# Patient Record
(demographics unavailable — no encounter records)

---

## 2025-02-20 NOTE — END OF VISIT
2.06
[FreeTextEntry3] : I was present with the PA during the key portions of the history and exam. I agree with the findings and plan as documented in the PA's note, unless noted below.

## 2025-02-20 NOTE — HISTORY OF PRESENT ILLNESS
[de-identified] : 60 yo female was referred by Dr. Thomas for consultation of adjuvant endocrine therapy for DCIS. She had screening mammo on 21 which showed an apparent mass seen in the central of the right breast. 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 The pathology revealed ductal carcinoma in-situ (DCIS), cribriform and solid types with comedo necrosis and calcifications, intermediate nuclear grade. The intraductal tumor is present in three of ten H T E slides examined from this entirely submitted specimen (3/10). All margin excisions are negative. \par  \par  She recovered well from surgery. She saw Dr. Garner and discussed adjuvant breast radiotherapy. She opted to forgo radiation.\par  \par  She is  and was menopause in . She has no family history of breast or ovarian cancer. [de-identified] : 12/22/21 Dayanara is here for follow up. She has right breast DCIS, intermediate nuclear grade, ER/CT positive, s/p right breast lumpectomy with negative margins. She was not indicated for adjuvant breast radiation. She was started on anastrazole in 9/2021, tolerating fairly well. She started experiencing vaginal bleeding in October. She had an endometrial biopsy on 10/11 which showed microscopic foci of well-differentiated endometrioid carcinoma, arising in a background of complex atypical hyperplasia. She saw Dr Negron and on 11/29 she had hysterectomy and bilateral salpingo-oophorectomy. Uterine corpus showed endometrioid carcinoma, FIGO grade I, pT1a. The tumor is superficial (confined to endometrium); does not invade myometrium. Cervix, Ovaries and fallopian tubes are uninvolved. MMR proteins are all expressed. She recovered well from surgery.   6/29/22: Dayanara is here for follow up for right breast DCIS, intermediate nuclear grade, ER/CT positive, s/p right breast lumpectomy with negative margins. She was not indicated for adjuvant breast radiation. She has been on anastrazole since 9/2021 and tolerating well. She hadvaginal bleeding in October. She had an endometrial biopsy on 10/11 which showed microscopic foci of well-differentiated endometrioid carcinoma, arising in a background of complex atypical hyperplasia. She saw Dr Negron and on 11/29 she had hysterectomy and bilateral salpingo-oophorectomy. Uterine corpus showed endometrioid carcinoma, FIGO grade I, pT1a. The tumor is superficial (confined to endometrium); does not invade myometrium. Cervix, Ovaries and fallopian tubes are uninvolved. MMR proteins are all expressed. She is feeling well and cummings snot have new complains.  1/4/23: Dayanara is here for follow up for right breast DCIS, intermediate nuclear grade, ER/CT positive, s/p right breast lumpectomy with negative margins. She was not indicated for adjuvant breast radiation. She has been on anastrazole since 9/2021 and tolerating well. She had b/l dx mammo in 6/2022. There was no suspicious finding. She had vaginal bleeding in October. She had an endometrial biopsy on 10/11 which showed microscopic foci of well-differentiated endometrioid carcinoma, arising in a background of complex atypical hyperplasia. She saw Dr Negron and on 11/29 she had hysterectomy and bilateral salpingo-oophorectomy. Uterine corpus showed endometrioid carcinoma, FIGO grade I, pT1a. The tumor is superficial (confined to endometrium); does not invade myometrium. Cervix, Ovaries and fallopian tubes are uninvolved. MMR proteins are all expressed. She is feeling well and does not have new complains.  7/12/23: Dayanara is here for follow up for right breast DCIS, intermediate nuclear grade, ER/CT positive, s/p right breast lumpectomy with negative margins. She was not indicated for adjuvant breast radiation. She has been on anastrazole since 9/2021 and tolerating well. She had b/l dx mammo in 6/2023. There was no suspicious finding. She is s/p hysterectomy and bilateral salpingo-oophorectomy in 11/2022. Uterine corpus showed endometrioid carcinoma, FIGO grade I, pT1a. The tumor is superficial (confined to endometrium); does not invade myometrium. Cervix, Ovaries and fallopian tubes are uninvolved. MMR proteins are all expressed. She is feeling well and does not have new complains.  1/10/24 Dayanara is here for follow up for right breast DCIS, intermediate nuclear grade, ER/CT positive, s/p right breast lumpectomy with negative margins. She was not indicated for adjuvant breast radiation. She has been on anastrazole since 9/2021 and tolerating well. She had b/l dx mammo in 6/2023. There was no suspicious finding. She is s/p hysterectomy and bilateral salpingo-oophorectomy in 11/2022. Uterine corpus showed endometrioid carcinoma, FIGO grade I, pT1a. The tumor is superficial (confined to endometrium); does not invade myometrium. Cervix, Ovaries and fallopian tubes are uninvolved. MMR proteins are all expressed. She is feeling well and does not have new complaints. She denies bleeding, or change in urine or bowel habits.  7/3/24 Dayanara is here for follow up for right breast DCIS, intermediate nuclear grade, ER/CT positive, s/p right breast lumpectomy with negative margins. She was not indicated for adjuvant breast radiation. She has been on anastrazole since 9/2021 and tolerating well. She had b/l dx mammo in 6/19/2024. There was no suspicious finding. She is s/p hysterectomy and bilateral salpingo-oophorectomy in 11/2022. Uterine corpus showed endometrioid carcinoma, FIGO grade I, pT1a. The tumor is superficial (confined to endometrium); does not invade myometrium. Cervix, Ovaries and fallopian tubes are uninvolved. MMR proteins are all expressed. She is feeling well, although she admits to feeling bony aches and pains since being on the anastrozole. She denies bleeding, or change in urine or bowel habits. She admits to some fatigue and SOB with exertion at times. She was recommended to have full cardiac evaluation, which is scheduled.  2/20/25 Dayanara is here for follow up for right breast DCIS, intermediate nuclear grade, ER/CT positive, s/p right breast lumpectomy with negative margins. She was not indicated for adjuvant breast radiation. She has been on Anastrazole since 9/2021 and tolerating well except for intermittent myalgia and arthralgia. She had b/l screening mammo in 6/19/2024. There was no suspicious finding. She is s/p hysterectomy and bilateral salpingo-oophorectomy in 11/2022. Uterine corpus showed endometrioid carcinoma, FIGO grade I, pT1a. The tumor is superficial (confined to endometrium); does not invade myometrium. Cervix, Ovaries and fallopian tubes are uninvolved. MMR proteins are all expressed. She has not seen gyn/onc since after her last visit with Dr Figueroa on 10/7/2022. She denies abdominal pain, nausea, vomiting or abnormal vaginal bleeding/discharge. She had CBC done on 9/2024 with platelets 96 K/uL, denies bleeding or bruising.

## 2025-02-20 NOTE — HISTORY OF PRESENT ILLNESS
[de-identified] : 62 yo female was referred by Dr. Thomas for consultation of adjuvant endocrine therapy for DCIS. She had screening mammo on 21 which showed an apparent mass seen in the central of the right breast. On 4/15/21, right breast dx mammo and US showed 0.8 x 0.6 x 0.4 cm Indeterminate right breast mass at the 8:00 position for which ultrasound-guided biopsy is recommended.\par  \par  On 21, ultrasound guided needle core biopsies of right breast mass revealed Ductal carcinoma in-situ (DCIS), ER/CA pos 100%, solid and micropapillary types with comedo necrosis and calcifications, intermediate nuclear grade; extending to involve a sclerosing intraductal papilloma.\par  \par  On 21, she underwent right lower outer quadrant mass, radio frequency seed localized lumpectomy. The pathology revealed ductal carcinoma in-situ (DCIS), cribriform and solid types with comedo necrosis and calcifications, intermediate nuclear grade. The intraductal tumor is present in three of ten H T E slides examined from this entirely submitted specimen (3/10). All margin excisions are negative. \par  \par  She recovered well from surgery. She saw Dr. Garner and discussed adjuvant breast radiotherapy. She opted to forgo radiation.\par  \par  She is  and was menopause in . She has no family history of breast or ovarian cancer. [de-identified] : 12/22/21 Dayanara is here for follow up. She has right breast DCIS, intermediate nuclear grade, ER/HI positive, s/p right breast lumpectomy with negative margins. She was not indicated for adjuvant breast radiation. She was started on anastrazole in 9/2021, tolerating fairly well. She started experiencing vaginal bleeding in October. She had an endometrial biopsy on 10/11 which showed microscopic foci of well-differentiated endometrioid carcinoma, arising in a background of complex atypical hyperplasia. She saw Dr Negron and on 11/29 she had hysterectomy and bilateral salpingo-oophorectomy. Uterine corpus showed endometrioid carcinoma, FIGO grade I, pT1a. The tumor is superficial (confined to endometrium); does not invade myometrium. Cervix, Ovaries and fallopian tubes are uninvolved. MMR proteins are all expressed. She recovered well from surgery.   6/29/22: Dayanara is here for follow up for right breast DCIS, intermediate nuclear grade, ER/HI positive, s/p right breast lumpectomy with negative margins. She was not indicated for adjuvant breast radiation. She has been on anastrazole since 9/2021 and tolerating well. She hadvaginal bleeding in October. She had an endometrial biopsy on 10/11 which showed microscopic foci of well-differentiated endometrioid carcinoma, arising in a background of complex atypical hyperplasia. She saw Dr Negron and on 11/29 she had hysterectomy and bilateral salpingo-oophorectomy. Uterine corpus showed endometrioid carcinoma, FIGO grade I, pT1a. The tumor is superficial (confined to endometrium); does not invade myometrium. Cervix, Ovaries and fallopian tubes are uninvolved. MMR proteins are all expressed. She is feeling well and cummings snot have new complains.  1/4/23: Dayanara is here for follow up for right breast DCIS, intermediate nuclear grade, ER/HI positive, s/p right breast lumpectomy with negative margins. She was not indicated for adjuvant breast radiation. She has been on anastrazole since 9/2021 and tolerating well. She had b/l dx mammo in 6/2022. There was no suspicious finding. She had vaginal bleeding in October. She had an endometrial biopsy on 10/11 which showed microscopic foci of well-differentiated endometrioid carcinoma, arising in a background of complex atypical hyperplasia. She saw Dr Negron and on 11/29 she had hysterectomy and bilateral salpingo-oophorectomy. Uterine corpus showed endometrioid carcinoma, FIGO grade I, pT1a. The tumor is superficial (confined to endometrium); does not invade myometrium. Cervix, Ovaries and fallopian tubes are uninvolved. MMR proteins are all expressed. She is feeling well and does not have new complains.  7/12/23: Dayanara is here for follow up for right breast DCIS, intermediate nuclear grade, ER/HI positive, s/p right breast lumpectomy with negative margins. She was not indicated for adjuvant breast radiation. She has been on anastrazole since 9/2021 and tolerating well. She had b/l dx mammo in 6/2023. There was no suspicious finding. She is s/p hysterectomy and bilateral salpingo-oophorectomy in 11/2022. Uterine corpus showed endometrioid carcinoma, FIGO grade I, pT1a. The tumor is superficial (confined to endometrium); does not invade myometrium. Cervix, Ovaries and fallopian tubes are uninvolved. MMR proteins are all expressed. She is feeling well and does not have new complains.  1/10/24 Dayanara is here for follow up for right breast DCIS, intermediate nuclear grade, ER/HI positive, s/p right breast lumpectomy with negative margins. She was not indicated for adjuvant breast radiation. She has been on anastrazole since 9/2021 and tolerating well. She had b/l dx mammo in 6/2023. There was no suspicious finding. She is s/p hysterectomy and bilateral salpingo-oophorectomy in 11/2022. Uterine corpus showed endometrioid carcinoma, FIGO grade I, pT1a. The tumor is superficial (confined to endometrium); does not invade myometrium. Cervix, Ovaries and fallopian tubes are uninvolved. MMR proteins are all expressed. She is feeling well and does not have new complaints. She denies bleeding, or change in urine or bowel habits.  7/3/24 Dayanara is here for follow up for right breast DCIS, intermediate nuclear grade, ER/HI positive, s/p right breast lumpectomy with negative margins. She was not indicated for adjuvant breast radiation. She has been on anastrazole since 9/2021 and tolerating well. She had b/l dx mammo in 6/19/2024. There was no suspicious finding. She is s/p hysterectomy and bilateral salpingo-oophorectomy in 11/2022. Uterine corpus showed endometrioid carcinoma, FIGO grade I, pT1a. The tumor is superficial (confined to endometrium); does not invade myometrium. Cervix, Ovaries and fallopian tubes are uninvolved. MMR proteins are all expressed. She is feeling well, although she admits to feeling bony aches and pains since being on the anastrozole. She denies bleeding, or change in urine or bowel habits. She admits to some fatigue and SOB with exertion at times. She was recommended to have full cardiac evaluation, which is scheduled.  2/20/25 Dayanara is here for follow up for right breast DCIS, intermediate nuclear grade, ER/HI positive, s/p right breast lumpectomy with negative margins. She was not indicated for adjuvant breast radiation. She has been on Anastrazole since 9/2021 and tolerating well except for intermittent myalgia and arthralgia. She had b/l screening mammo in 6/19/2024. There was no suspicious finding. She is s/p hysterectomy and bilateral salpingo-oophorectomy in 11/2022. Uterine corpus showed endometrioid carcinoma, FIGO grade I, pT1a. The tumor is superficial (confined to endometrium); does not invade myometrium. Cervix, Ovaries and fallopian tubes are uninvolved. MMR proteins are all expressed. She has not seen gyn/onc since after her last visit with Dr Figueroa on 10/7/2022. She denies abdominal pain, nausea, vomiting or abnormal vaginal bleeding/discharge. She had CBC done on 9/2024 with platelets 96 K/uL, denies bleeding or bruising.

## 2025-02-20 NOTE — ASSESSMENT
[FreeTextEntry1] : 65 yo female has right breast DCIS, intermediate nuclear grade, ER/MS positive, s/p right breast lumpectomy with negative margins.  Endometrioid carcinoma, FIGO grade I, pT1a. MMR proficient.  Assessment and Plan: -- Continue Anastrozole daily (started on 9/2021), eRx sent. -- Breast exam today did not reveal palpable abnormality. B/L screening mammo on 6/19/24 did not show suspicious finding. She will continue annual screening mammo due on 6/2025, pt has script. -- Bone density in 6/14/2023 is within normal limit. Continue calcium and vitamin D supplement, and regular physical activities. She is due for repeat bone density on 6/2025, script given. -- Endometrioid carcinoma, FIGO grade I, pT1a. S/P TINO and BSO. Continue active surveillance. Emphasized to follow up with GYN. -- CBC on 9/2025 showed platelet 96 K/uL. Pt is asymptomatic. Will repeat CBC today. -- Follow up with PCP and GI for health maintenance and screenings. -- RTO for f/u in 6 months to see Dr Trejo. She will call if she has concerns. 27-Feb-2017

## 2025-02-20 NOTE — ASSESSMENT
[FreeTextEntry1] : 65 yo female has right breast DCIS, intermediate nuclear grade, ER/TN positive, s/p right breast lumpectomy with negative margins.  Endometrioid carcinoma, FIGO grade I, pT1a. MMR proficient.  Assessment and Plan: -- Continue Anastrozole daily (started on 9/2021), eRx sent. -- Breast exam today did not reveal palpable abnormality. B/L screening mammo on 6/19/24 did not show suspicious finding. She will continue annual screening mammo due on 6/2025, pt has script. -- Bone density in 6/14/2023 is within normal limit. Continue calcium and vitamin D supplement, and regular physical activities. She is due for repeat bone density on 6/2025, script given. -- Endometrioid carcinoma, FIGO grade I, pT1a. S/P TINO and BSO. Continue active surveillance. Emphasized to follow up with GYN. -- CBC on 9/2025 showed platelet 96 K/uL. Pt is asymptomatic. Will repeat CBC today. -- Follow up with PCP and GI for health maintenance and screenings. -- RTO for f/u in 6 months to see Dr Trejo. She will call if she has concerns.

## 2025-02-20 NOTE — BEGINNING OF VISIT
[0] : 2) Feeling down, depressed, or hopeless: Not at all (0) [PHQ-2 Negative] : PHQ-2 Negative [Pain Scale: ___] : On a scale of 1-10, today the patient's pain is a(n) [unfilled]. [Never] : Never [Reviewed, no changes] : Reviewed, no changes [[id number]] : 0 [Future Reassessment of Pain Scale] : Future reassessment of pain scale [Date Discussed (MM/DD/YY): ___] : Discussed: [unfilled] [Abdominal Pain] : no abdominal pain [Vomiting] : no vomiting [Constipation] : no constipation [Diarrhea Character] : Diarrhea: Grade 0

## 2025-02-20 NOTE — BEGINNING OF VISIT
[0] : 2) Feeling down, depressed, or hopeless: Not at all (0) [PHQ-2 Negative] : PHQ-2 Negative [Pain Scale: ___] : On a scale of 1-10, today the patient's pain is a(n) [unfilled]. [Never] : Never [Reviewed, no changes] : Reviewed, no changes [[id number]] : 0 [Future Reassessment of Pain Scale] : Future reassessment of pain scale [Date Discussed (MM/DD/YY): ___] : Discussed: [unfilled] [Abdominal Pain] : no abdominal pain [Vomiting] : no vomiting [Constipation] : no constipation [Diarrhea Character] : Diarrhea: Grade 0

## 2025-02-20 NOTE — PHYSICAL EXAM
[Fully active, able to carry on all pre-disease performance without restriction] : Status 0 - Fully active, able to carry on all pre-disease performance without restriction [Obese] : obese [Normal] : grossly intact [de-identified] : s/p right breast lumpectomy. There is no palpable abnormality. [de-identified] : S/p hysterectomy and bilateral salpingo-oophorectomy, surgical scars are healed well.  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

## 2025-02-20 NOTE — BEGINNING OF VISIT
[0] : 2) Feeling down, depressed, or hopeless: Not at all (0) [PHQ-2 Negative] : PHQ-2 Negative [Pain Scale: ___] : On a scale of 1-10, today the patient's pain is a(n) [unfilled]. [Never] : Never [Reviewed, no changes] : Reviewed, no changes [[id number]] : 0 [Future Reassessment of Pain Scale] : Future reassessment of pain scale [Date Discussed (MM/DD/YY): ___] : Discussed: [unfilled] [Abdominal Pain] : no abdominal pain [Vomiting] : no vomiting [Constipation] : no constipation [Diarrhea Character] : Diarrhea: Grade 0

## 2025-02-20 NOTE — PHYSICAL EXAM
[Fully active, able to carry on all pre-disease performance without restriction] : Status 0 - Fully active, able to carry on all pre-disease performance without restriction [Obese] : obese [Normal] : grossly intact [de-identified] : s/p right breast lumpectomy. There is no palpable abnormality. [de-identified] : S/p hysterectomy and bilateral salpingo-oophorectomy, surgical scars are healed well.  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

## 2025-02-20 NOTE — ASSESSMENT
[FreeTextEntry1] : 65 yo female has right breast DCIS, intermediate nuclear grade, ER/CA positive, s/p right breast lumpectomy with negative margins.  Endometrioid carcinoma, FIGO grade I, pT1a. MMR proficient.  Assessment and Plan: -- Continue Anastrozole daily (started on 9/2021), eRx sent. -- Breast exam today did not reveal palpable abnormality. B/L screening mammo on 6/19/24 did not show suspicious finding. She will continue annual screening mammo due on 6/2025, pt has script. -- Bone density in 6/14/2023 is within normal limit. Continue calcium and vitamin D supplement, and regular physical activities. She is due for repeat bone density on 6/2025, script given. -- Endometrioid carcinoma, FIGO grade I, pT1a. S/P TINO and BSO. Continue active surveillance. Emphasized to follow up with GYN. -- CBC on 9/2025 showed platelet 96 K/uL. Pt is asymptomatic. Will repeat CBC today. -- Follow up with PCP and GI for health maintenance and screenings. -- RTO for f/u in 6 months to see Dr Trejo. She will call if she has concerns.

## 2025-02-20 NOTE — CONSULT LETTER
[Dear  ___] : Dear  [unfilled], [Courtesy Letter:] : I had the pleasure of seeing your patient, [unfilled], in my office today. [Please see my note below.] : Please see my note below. [Sincerely,] : Sincerely, [DrSonali  ___] : Dr. KAUR [FreeTextEntry3] : Alysha lowe MD

## 2025-02-20 NOTE — PHYSICAL EXAM
[Fully active, able to carry on all pre-disease performance without restriction] : Status 0 - Fully active, able to carry on all pre-disease performance without restriction [Obese] : obese [Normal] : grossly intact [de-identified] : s/p right breast lumpectomy. There is no palpable abnormality. [de-identified] : S/p hysterectomy and bilateral salpingo-oophorectomy, surgical scars are healed well.  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

## 2025-02-20 NOTE — HISTORY OF PRESENT ILLNESS
[de-identified] : 62 yo female was referred by Dr. Thomas for consultation of adjuvant endocrine therapy for DCIS. She had screening mammo on 21 which showed an apparent mass seen in the central of the right breast. 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 The pathology revealed ductal carcinoma in-situ (DCIS), cribriform and solid types with comedo necrosis and calcifications, intermediate nuclear grade. The intraductal tumor is present in three of ten H T E slides examined from this entirely submitted specimen (3/10). All margin excisions are negative. \par  \par  She recovered well from surgery. She saw Dr. Garner and discussed adjuvant breast radiotherapy. She opted to forgo radiation.\par  \par  She is  and was menopause in . She has no family history of breast or ovarian cancer. [de-identified] : 12/22/21 Dayanara is here for follow up. She has right breast DCIS, intermediate nuclear grade, ER/RI positive, s/p right breast lumpectomy with negative margins. She was not indicated for adjuvant breast radiation. She was started on anastrazole in 9/2021, tolerating fairly well. She started experiencing vaginal bleeding in October. She had an endometrial biopsy on 10/11 which showed microscopic foci of well-differentiated endometrioid carcinoma, arising in a background of complex atypical hyperplasia. She saw Dr Negron and on 11/29 she had hysterectomy and bilateral salpingo-oophorectomy. Uterine corpus showed endometrioid carcinoma, FIGO grade I, pT1a. The tumor is superficial (confined to endometrium); does not invade myometrium. Cervix, Ovaries and fallopian tubes are uninvolved. MMR proteins are all expressed. She recovered well from surgery.   6/29/22: Dayanara is here for follow up for right breast DCIS, intermediate nuclear grade, ER/RI positive, s/p right breast lumpectomy with negative margins. She was not indicated for adjuvant breast radiation. She has been on anastrazole since 9/2021 and tolerating well. She hadvaginal bleeding in October. She had an endometrial biopsy on 10/11 which showed microscopic foci of well-differentiated endometrioid carcinoma, arising in a background of complex atypical hyperplasia. She saw Dr Negron and on 11/29 she had hysterectomy and bilateral salpingo-oophorectomy. Uterine corpus showed endometrioid carcinoma, FIGO grade I, pT1a. The tumor is superficial (confined to endometrium); does not invade myometrium. Cervix, Ovaries and fallopian tubes are uninvolved. MMR proteins are all expressed. She is feeling well and cummings snot have new complains.  1/4/23: Dayanara is here for follow up for right breast DCIS, intermediate nuclear grade, ER/RI positive, s/p right breast lumpectomy with negative margins. She was not indicated for adjuvant breast radiation. She has been on anastrazole since 9/2021 and tolerating well. She had b/l dx mammo in 6/2022. There was no suspicious finding. She had vaginal bleeding in October. She had an endometrial biopsy on 10/11 which showed microscopic foci of well-differentiated endometrioid carcinoma, arising in a background of complex atypical hyperplasia. She saw Dr Negron and on 11/29 she had hysterectomy and bilateral salpingo-oophorectomy. Uterine corpus showed endometrioid carcinoma, FIGO grade I, pT1a. The tumor is superficial (confined to endometrium); does not invade myometrium. Cervix, Ovaries and fallopian tubes are uninvolved. MMR proteins are all expressed. She is feeling well and does not have new complains.  7/12/23: Dayanara is here for follow up for right breast DCIS, intermediate nuclear grade, ER/RI positive, s/p right breast lumpectomy with negative margins. She was not indicated for adjuvant breast radiation. She has been on anastrazole since 9/2021 and tolerating well. She had b/l dx mammo in 6/2023. There was no suspicious finding. She is s/p hysterectomy and bilateral salpingo-oophorectomy in 11/2022. Uterine corpus showed endometrioid carcinoma, FIGO grade I, pT1a. The tumor is superficial (confined to endometrium); does not invade myometrium. Cervix, Ovaries and fallopian tubes are uninvolved. MMR proteins are all expressed. She is feeling well and does not have new complains.  1/10/24 Dayanara is here for follow up for right breast DCIS, intermediate nuclear grade, ER/RI positive, s/p right breast lumpectomy with negative margins. She was not indicated for adjuvant breast radiation. She has been on anastrazole since 9/2021 and tolerating well. She had b/l dx mammo in 6/2023. There was no suspicious finding. She is s/p hysterectomy and bilateral salpingo-oophorectomy in 11/2022. Uterine corpus showed endometrioid carcinoma, FIGO grade I, pT1a. The tumor is superficial (confined to endometrium); does not invade myometrium. Cervix, Ovaries and fallopian tubes are uninvolved. MMR proteins are all expressed. She is feeling well and does not have new complaints. She denies bleeding, or change in urine or bowel habits.  7/3/24 Dayanara is here for follow up for right breast DCIS, intermediate nuclear grade, ER/RI positive, s/p right breast lumpectomy with negative margins. She was not indicated for adjuvant breast radiation. She has been on anastrazole since 9/2021 and tolerating well. She had b/l dx mammo in 6/19/2024. There was no suspicious finding. She is s/p hysterectomy and bilateral salpingo-oophorectomy in 11/2022. Uterine corpus showed endometrioid carcinoma, FIGO grade I, pT1a. The tumor is superficial (confined to endometrium); does not invade myometrium. Cervix, Ovaries and fallopian tubes are uninvolved. MMR proteins are all expressed. She is feeling well, although she admits to feeling bony aches and pains since being on the anastrozole. She denies bleeding, or change in urine or bowel habits. She admits to some fatigue and SOB with exertion at times. She was recommended to have full cardiac evaluation, which is scheduled.  2/20/25 Dayanara is here for follow up for right breast DCIS, intermediate nuclear grade, ER/RI positive, s/p right breast lumpectomy with negative margins. She was not indicated for adjuvant breast radiation. She has been on Anastrazole since 9/2021 and tolerating well except for intermittent myalgia and arthralgia. She had b/l screening mammo in 6/19/2024. There was no suspicious finding. She is s/p hysterectomy and bilateral salpingo-oophorectomy in 11/2022. Uterine corpus showed endometrioid carcinoma, FIGO grade I, pT1a. The tumor is superficial (confined to endometrium); does not invade myometrium. Cervix, Ovaries and fallopian tubes are uninvolved. MMR proteins are all expressed. She has not seen gyn/onc since after her last visit with Dr Figueroa on 10/7/2022. She denies abdominal pain, nausea, vomiting or abnormal vaginal bleeding/discharge. She had CBC done on 9/2024 with platelets 96 K/uL, denies bleeding or bruising.

## 2025-04-11 NOTE — PHYSICAL EXAM
[Alert] : alert [Well Nourished] : well nourished [No Acute Distress] : no acute distress [Well Developed] : well developed [Normal Sclera/Conjunctiva] : normal sclera/conjunctiva [EOMI] : extra ocular movement intact [No Proptosis] : no proptosis [Normal Oropharynx] : the oropharynx was normal [Thyroid Not Enlarged] : the thyroid was not enlarged [No Respiratory Distress] : no respiratory distress [No Accessory Muscle Use] : no accessory muscle use [Clear to Auscultation] : lungs were clear to auscultation bilaterally [Normal S1, S2] : normal S1 and S2 [Normal Rate] : heart rate was normal [Regular Rhythm] : with a regular rhythm [No Edema] : no peripheral edema [Pedal Pulses Normal] : the pedal pulses are present [Normal Bowel Sounds] : normal bowel sounds [Not Tender] : non-tender [Not Distended] : not distended [Soft] : abdomen soft [Normal Anterior Cervical Nodes] : no anterior cervical lymphadenopathy [No Spinal Tenderness] : no spinal tenderness [Spine Straight] : spine straight [No Stigmata of Cushings Syndrome] : no stigmata of Cushings Syndrome [Normal Gait] : normal gait [Normal Strength/Tone] : muscle strength and tone were normal [No Rash] : no rash [Normal Reflexes] : deep tendon reflexes were 2+ and symmetric [No Tremors] : no tremors [Oriented x3] : oriented to person, place, and time

## 2025-06-27 NOTE — DATA REVIEWED
[FreeTextEntry1] : B/L Screening Mammo - 06/21/2025: MAMMOGRAM FINDINGS: Mammography was performed including the following views: bilateral craniocaudal with tomosynthesis, bilateral mediolateral oblique with tomosynthesis.  The examination includes digital synthetic 2D and digital tomosynthesis 3D images. Additional imaging analysis was performed using CAD (computer-aided detection) software.  The breasts are almost entirely fatty.  Finding 1:  There is an area of benign architectural distortion corresponding to the site of surgery seen in the right breast.  Finding 2:  There is a stable focal asymmetry seen in the right breast.  No suspicious mass, grouping of calcifications, or other abnormality is identified.  IMPRESSION: There is no mammographic evidence of malignancy.  RECOMMENDATION: Unless otherwise indicated by clinical findings, annual screening mammography recommended.  ASSESSMENT: BI-RADS Category 2:  Benign

## 2025-06-27 NOTE — HISTORY OF PRESENT ILLNESS
[FreeTextEntry1] : Gonzalo is a 61 F who presents with a screen detected right breast DCIS, ER/SC pos, stage 0 breast cancer, s/p R WLE on 2021.   2021 -- R WLE  -FEA -DCIS, intermediate nuclear grade -0.2 mm from new superior/medial margin  -additional margins:  --posterior -- neg for carcinoma   --superior -- neg for carcinoma  --medial -- neg for carcinoma   Her work up was as follows:  2021 -- b/l screening mammogram  -scattered areas of fibroglandular densities -R: apparent mass in central R breast  -no suspicious mass, microcalcifications or architectural distortion in L  BIRADS 0  4/15/2021 -- R dx mammogram and US  -scattered areas of fibroglandular densities -R: lower outer quadrant, indeterminate mass  -biopsy clip in lower inner quadrant  R US  -@8N3, slightly irregular mass, measures 8 x 6 x 4 mm --> BIOPSY  BIRADS 4   2021 -- R US CNBx @8N3 (top hat)  -ductal carcinoma in situ (DCIS), intermediate nuclear grade, solid and micropapillary types with comedo necrosis and calcifications, extending to involve a sclerosing intraductal papilloma  -ER/SC pos (Paty )   She has no breast related complaints at this time.  She denies any breast pain, has not palpated any new palpable masses in either breast and denies any nipple discharge or retraction.    HISTORICAL RISK FACTORS:  -1 prior benign r breast biopsies,  no prior surgeries  -no family history of breast or ovarian cancer  - -prior OCP use x 2 years in past  -TINO in 2021 for uterine cancer   INTERVAL HISTORY:  Gonzalo returns for her FIRST POST OP visit, s/p R WLE on 2021 for DCIS.   Her final pathology revealed DCIS, intermediate nuclear grade, ER/SC pos, with negative surgical margins.  She is healing well from her recent surgery.  She did have some bruising at her surgical site, but denies any pain, redness, fevers, or chills.    INTERVAL HISTORY 22 Gonzalo is here for her SIX MONTHS FOLLOW UP VISIT She opted to forgo radiation. Started on anastrazole in 2021, tolerating fairly well  Her imaging is as follows: 2022 RIGHT dx mammo -scattered areas of fibroglandular density. -status post a right lumpectomy with architectural distortion most consistent with postsurgical change.  BI-RADS 2  10/27/2022 -- GONZALO BURROUGHS is a 62 year old female patient who presents today in follow up for right breast DCIS. Since her last visit, she has no new beast related complaints.   Most recent imaging: B/L Dx Mammo - 2022: -There are scattered areas of fibroglandular density. -Status post right lumpectomy with associated fat necrosis and architectural distortion.  -Focal asymmetry in the upper outer quadrant of the right breast, stable since  consistent with a benign etiology.  -No mammographic evidence of malignancy in either breast. BI-RADS Category 2: Benign  She presents today for evaluation and imaging review.   2023 -- GONZALO BURROUGHS is a 62 year old female patient who presents today in follow up for right breast DCIS. Since her last visit, she has no new beast related complaints.   Most recent imaging: Right Uni Dx Mammo - 2023: -There are scattered areas of fibroglandular density. -Status post right lumpectomy with associated fat necrosis and architectural distortion.  -Focal asymmetry in the upper outer quadrant of the right breast, stable since  consistent with a benign etiology.  -No mammographic evidence of malignancy. BI-RADS Category 2: Benign  She presents today for evaluation and imaging review.   2024 -- GONZALO BURROUGHS is a 63-year-old female patient who presents today in follow up for right breast DCIS. Since her last visit, she has no new beast related complaints.   Most recent imaging: B/L Screening Mammo - 2024: -There are scattered areas of fibroglandular density. -There is an area of architectural distortion at the site of lumpectomy seen in the right breast. -No suspicious mass, grouping of calcifications, or other abnormality is identified. -There is no mammographic evidence of malignancy. BI-RADS Category 2:  Benign   She presents today for evaluation and imaging review.   2025 -- GONZALO BURROUGHS is a 64-year-old female patient who presents today in follow up for right breast DCIS. Since her last visit, she has no new beast related complaints.   Most recent imaging: B/L Screening Mammo - 2025: -The breasts are almost entirely fatty. -There is an area of benign architectural distortion corresponding to the site of surgery seen in the right breast. -There is a stable focal asymmetry seen in the right breast. -No suspicious mass, grouping of calcifications, or other abnormality is identified. IMPRESSION: There is no mammographic evidence of malignancy. BI-RADS Category 2:  Benign   She presents today for evaluation and imaging review.

## 2025-06-27 NOTE — ASSESSMENT
[FreeTextEntry1] : Dayanara is a 64 F who presents with a screen detected right breast DCIS, ER/IA pos, stage 0 breast cancer, s/p R WLE on 7/14/2021.   7/14/2021 -- R WLE  -FEA -DCIS, intermediate nuclear grade -0.2 mm from new superior/medial margin  -additional margins:  --posterior -- neg for carcinoma   --superior -- neg for carcinoma  --medial -- neg for carcinoma   On physical exam, there are no discrete masses in either breast or axilla. There is no nipple discharge or inversion bilaterally. There are no skin changes bilaterally. Right breast well healed surgical scar   Most recent imaging: B/L Screening Mammo - 06/21/2025: -The breasts are almost entirely fatty. -There is an area of benign architectural distortion corresponding to the site of surgery seen in the right breast. -There is a stable focal asymmetry seen in the right breast. -No suspicious mass, grouping of calcifications, or other abnormality is identified. IMPRESSION: There is no mammographic evidence of malignancy. BI-RADS Category 2:  Benign  She will be due for a bilateral screening mammogram in June 2026.  This will be scheduled for her today.   She should return for a clinical breast exam following (June 2026). --- Her final pathology revealed DCIS, intermediate nuclear grade, ER/IA pos, with negative surgical margins.  No further surgical intervention is indicated.   She will be referred to medical oncology and radiation oncology for further evaluation of endocrine therapy and radiation therapy.    AS REVIEW:  Her most recent imaging was a b/l screening mammogram and R dx mammogram and US on 4/8/2021 and 4/15/2021 respectively, which revealed in her right breast @8N3, a 8 x 6 x 4 mm mass which was biopsy proven DCIS involving a papilloma.   -we discussed her surgical options at this time. She is a good candidate for breast conservation surgery with a lumpectomy and radiation therapy following surgery vs. mastectomy. She was motivated to undergo breast conservation surgery. It was explained to the patient that the survival is the same regardless of which surgical procedure she would like to proceed with however the local regional recurrence is slightly higher with the lumpectomy, but the recurrence rate for mastectomy is also not zero and is closer to about 4-9%.  Because this lesion is not palpable, we will localize the lesion with a RF loc prior to the procedure.   -At this time, she would like to undergo breast conservation surgery.  I have offered to obtain a bilateral breast MRI to further delineate the extent of disease, both in the R breast and the contralateral breast.  I have explained that there is no difference in survival by obtaining an MRI pre-operatively, however it is a more sensitive study than the mammogram.  It is not a very specific study, however, and if any abnormalities are found, she would most likely need a biopsy to confirm the diagnosis.  At this time, she does not have a family history and does not have dense breasts, so we will forego the breast MRI.    -we discussed that with noninvasive cancer, will not need to perform a sentinel LN biopsy unless final pathology reveals invasive cancer.    -The risks related to procedures including bleeding, infection, and possible re-operation were explained to the patient.   -we discussed that the likelihood she would need systemic chemotherapy is very low as she would not require it if final pathology revealed DCIS.    -she will be considered for radiation therapy following surgery and risks related to skin changes, lung scarring and fatigue were briefly discussed with the patient.    -At the completion of all these therapies, she will need to be on endocrine therapy for at least 5 years as a means to reduce rates of recurrence by up to 50%. We briefly discussed the side effects of tamoxifen including a 1 % chance of uterine cancer and thromboembolic disease, although she would more likely be offered Arimidex as she is postmenopausal.  --- I spent a total of 30 minutes of face-to-face time with this patient, greater than 50% of which was spent in counseling and/or coordination of care. All of her questions were appropriately answered. She knows to call with any concerns.   PLAN:  -B/L Screening Mammo - June 2026. -Return for a clinical breast exam following. -Med Onc follow-up as scheduled.

## 2025-06-27 NOTE — PAST MEDICAL HISTORY
[Menarche Age ____] : age at menarche was [unfilled] [Menopause Age____] : age at menopause was [unfilled] [Total Preg ___] : G[unfilled] [History of Hormone Replacement Treatment] : has no history of hormone replacement treatment [FreeTextEntry5] : TINO in November 2021 for uterine cancer  [FreeTextEntry6] : denies [FreeTextEntry7] : yes in past x 2 years  [FreeTextEntry8] : denies

## 2025-06-27 NOTE — PHYSICAL EXAM
[Normocephalic] : normocephalic [Atraumatic] : atraumatic [No Supraclavicular Adenopathy] : no supraclavicular adenopathy [No dominant masses] : no dominant masses in right breast  [No dominant masses] : no dominant masses left breast [No Nipple Discharge] : no left nipple discharge [No Rashes] : no rashes [No Ulceration] : no ulceration [Breast Nipple Inversion] : nipples not inverted [Breast Nipple Retraction] : nipples not retracted [de-identified] : well healed surgical scar; palpable scar tissue. [de-identified] : No axillary lymphadenopathy appreciated. [de-identified] : No axillary lymphadenopathy appreciated.